# Patient Record
Sex: MALE | Race: OTHER | Employment: UNEMPLOYED | URBAN - METROPOLITAN AREA
[De-identification: names, ages, dates, MRNs, and addresses within clinical notes are randomized per-mention and may not be internally consistent; named-entity substitution may affect disease eponyms.]

---

## 2020-11-04 ENCOUNTER — HOSPITAL ENCOUNTER (OUTPATIENT)
Age: 50
Discharge: OTHER INSTITUTION | End: 2020-11-04
Payer: COMMERCIAL

## 2020-11-04 VITALS
HEART RATE: 93 BPM | TEMPERATURE: 97 F | SYSTOLIC BLOOD PRESSURE: 126 MMHG | DIASTOLIC BLOOD PRESSURE: 89 MMHG | OXYGEN SATURATION: 95 % | RESPIRATION RATE: 20 BRPM

## 2020-11-04 DIAGNOSIS — R42 VERTIGO: Primary | ICD-10-CM

## 2020-11-04 DIAGNOSIS — R51.9 HEADACHE DISORDER: ICD-10-CM

## 2020-11-04 PROCEDURE — 99203 OFFICE O/P NEW LOW 30 MIN: CPT | Performed by: PHYSICIAN ASSISTANT

## 2020-11-04 NOTE — ED INITIAL ASSESSMENT (HPI)
Patient states he has had intermittent dizziness since last night. Had nausea and vomiting last night that has resolved. His blood pressure was high so he took his mother's blood pressure medication. Was taken by ambulance to [facility] last night.   Brendan

## 2020-11-04 NOTE — ED PROVIDER NOTES
Patient Seen in: Immediate [facility]      History   Patient presents with:  Nausea/vomiting  Dizziness    Stated Complaint: nausea/dizziness    HPI    21-year-old male. Medical history of hypertension and depression. Status post appendectomy.   Aldo Brown x 3  Neck: Supple, full range of motion  Eye examination: EOMs are intact, normal conjunctival  ENT: No significant audible nasal congestion. Cardiac: Regular rate and rhythm.   Normal S1-S2  Lung: No distress, RR, no retraction, breath sounds clear bilate

## 2023-03-15 ENCOUNTER — HOSPITAL ENCOUNTER (OUTPATIENT)
Age: 53
Discharge: HOME | End: 2023-03-15
Payer: COMMERCIAL

## 2023-03-15 VITALS
RESPIRATION RATE: 18 BRPM | SYSTOLIC BLOOD PRESSURE: 143 MMHG | TEMPERATURE: 98 F | OXYGEN SATURATION: 97 % | WEIGHT: 180 LBS | HEART RATE: 93 BPM | DIASTOLIC BLOOD PRESSURE: 101 MMHG

## 2023-03-15 DIAGNOSIS — Z20.822 CLOSE EXPOSURE TO COVID-19 VIRUS: Primary | ICD-10-CM

## 2023-03-15 LAB — SARS-COV-2 RNA RESP QL NAA+PROBE: NOT DETECTED

## 2023-03-15 PROCEDURE — U0002 COVID-19 LAB TEST NON-CDC: HCPCS | Performed by: NURSE PRACTITIONER

## 2023-03-15 PROCEDURE — 99202 OFFICE O/P NEW SF 15 MIN: CPT | Performed by: NURSE PRACTITIONER

## 2023-03-15 RX ORDER — AMLODIPINE BESYLATE 10 MG/1
10 TABLET ORAL DAILY
COMMUNITY
Start: 2023-02-02

## 2023-03-15 RX ORDER — ENALAPRIL MALEATE 20 MG/1
20 TABLET ORAL DAILY
COMMUNITY
Start: 2023-02-02

## 2023-03-15 NOTE — ED INITIAL ASSESSMENT (HPI)
Patient's wife was diagnosed with covid. He has no symptoms but wants to make sure he is still testing negative.

## (undated) NOTE — LETTER
Date & Time: 3/15/2023, 10:32 AM  Patient: Jesus Pierre  Encounter Provider(s):    LAUREL Mcintosh       To Whom It May Concern:    Jesus Pierre was seen and treated in our department on 3/15/2023. He should not return to work until 3/16/2023.     If you have any questions or concerns, please do not hesitate to call.        _____________________________  Physician/[facility] Signature